# Patient Record
Sex: MALE
[De-identification: names, ages, dates, MRNs, and addresses within clinical notes are randomized per-mention and may not be internally consistent; named-entity substitution may affect disease eponyms.]

---

## 2018-08-15 ENCOUNTER — HOSPITAL ENCOUNTER (EMERGENCY)
Dept: HOSPITAL 14 - H.ER | Age: 58
Discharge: HOME | End: 2018-08-15
Payer: MEDICAID

## 2018-08-15 VITALS — SYSTOLIC BLOOD PRESSURE: 148 MMHG | RESPIRATION RATE: 18 BRPM | HEART RATE: 90 BPM | DIASTOLIC BLOOD PRESSURE: 90 MMHG

## 2018-08-15 VITALS — TEMPERATURE: 98.9 F | OXYGEN SATURATION: 97 %

## 2018-08-15 DIAGNOSIS — Z45.2: Primary | ICD-10-CM

## 2018-08-15 DIAGNOSIS — Z85.038: ICD-10-CM
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
